# Patient Record
Sex: FEMALE | Race: OTHER | NOT HISPANIC OR LATINO | URBAN - METROPOLITAN AREA
[De-identification: names, ages, dates, MRNs, and addresses within clinical notes are randomized per-mention and may not be internally consistent; named-entity substitution may affect disease eponyms.]

---
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
## 2022-10-17 ENCOUNTER — NON-APPOINTMENT (OUTPATIENT)
Age: 22
End: 2022-10-17

## 2022-10-17 ENCOUNTER — APPOINTMENT (OUTPATIENT)
Dept: SURGERY | Facility: CLINIC | Age: 22
End: 2022-10-17

## 2022-10-17 VITALS
DIASTOLIC BLOOD PRESSURE: 62 MMHG | TEMPERATURE: 97.7 F | WEIGHT: 137 LBS | SYSTOLIC BLOOD PRESSURE: 94 MMHG | HEIGHT: 61 IN | HEART RATE: 72 BPM | OXYGEN SATURATION: 99 % | BODY MASS INDEX: 25.86 KG/M2

## 2022-10-17 DIAGNOSIS — D24.2 BENIGN NEOPLASM OF LEFT BREAST: ICD-10-CM

## 2022-10-17 DIAGNOSIS — Z80.3 FAMILY HISTORY OF MALIGNANT NEOPLASM OF BREAST: ICD-10-CM

## 2022-10-17 PROCEDURE — 99203 OFFICE O/P NEW LOW 30 MIN: CPT

## 2022-12-16 PROBLEM — Z80.3 FAMILY HISTORY OF MALIGNANT NEOPLASM OF BREAST: Status: ACTIVE | Noted: 2022-12-16

## 2022-12-16 NOTE — PHYSICAL EXAM
[Normal Breath Sounds] : Normal breath sounds [Normal Heart Sounds] : normal heart sounds [Normal Rate and Rhythm] : normal rate and rhythm [No Rash or Lesion] : No rash or lesion [Alert] : alert [Oriented to Person] : oriented to person [Oriented to Place] : oriented to place [Oriented to Time] : oriented to time [Calm] : calm [de-identified] : [id number] [de-identified] : No masses [de-identified] : Her breasts are symmetrical without any skin changes or nipple inversion.  She has no cervical, supraclavicular, or axillary adenopathy.  Her right breast has no masses.  Her left breast has about a 4 to 5 cm palpable mass that is fairly central and nontender.

## 2022-12-16 NOTE — HISTORY OF PRESENT ILLNESS
[de-identified] : The patient reports the recent development of a palpable mass in the left breast.  She denies any skin changes or nipple discharge.  She denies any other significant breast history.\par \par Breast risk factors: Menses at age 14 continue to have normal cycles.  She has never been pregnant and not used any hormones.  She has a paternal aunt with breast cancer.

## 2024-06-19 NOTE — ED PEDIATRIC TRIAGE NOTE - DIRECT TO ROOM CARE INITIATED:
Message sent to patient informing her that we are awaiting prior authorization completion on inhalers.   27-Jun-2018 23:40

## 2024-10-30 NOTE — ADDENDUM
[FreeTextEntry1] : MRI shows mild tensosynovitis\par Called mom and discussed treatment options\par Follow up w podietry  Statement Selected